# Patient Record
Sex: MALE | Race: WHITE | NOT HISPANIC OR LATINO
[De-identification: names, ages, dates, MRNs, and addresses within clinical notes are randomized per-mention and may not be internally consistent; named-entity substitution may affect disease eponyms.]

---

## 2018-01-26 ENCOUNTER — APPOINTMENT (OUTPATIENT)
Dept: NEUROLOGY | Facility: CLINIC | Age: 68
End: 2018-01-26
Payer: COMMERCIAL

## 2018-01-26 VITALS — HEART RATE: 92 BPM | DIASTOLIC BLOOD PRESSURE: 70 MMHG | SYSTOLIC BLOOD PRESSURE: 131 MMHG

## 2018-01-26 PROCEDURE — 99215 OFFICE O/P EST HI 40 MIN: CPT

## 2018-01-26 RX ORDER — ISRADIPINE 5 MG/1
5 CAPSULE ORAL
Qty: 720 | Refills: 0 | Status: ACTIVE | COMMUNITY
Start: 2017-06-20

## 2018-01-26 RX ORDER — RASAGILINE 1 MG/1
1 TABLET ORAL DAILY
Qty: 30 | Refills: 0 | Status: ACTIVE | COMMUNITY
Start: 2018-01-26 | End: 1900-01-01

## 2018-01-26 RX ORDER — PRAVASTATIN SODIUM 40 MG/1
40 TABLET ORAL
Qty: 90 | Refills: 0 | Status: ACTIVE | COMMUNITY
Start: 2017-07-25

## 2018-07-10 ENCOUNTER — APPOINTMENT (OUTPATIENT)
Dept: NEUROLOGY | Facility: CLINIC | Age: 68
End: 2018-07-10
Payer: COMMERCIAL

## 2018-07-10 VITALS
DIASTOLIC BLOOD PRESSURE: 58 MMHG | HEART RATE: 99 BPM | SYSTOLIC BLOOD PRESSURE: 122 MMHG | BODY MASS INDEX: 28.41 KG/M2 | HEIGHT: 67 IN | WEIGHT: 181 LBS

## 2018-07-10 PROCEDURE — 99214 OFFICE O/P EST MOD 30 MIN: CPT

## 2018-07-10 RX ORDER — DULOXETINE HYDROCHLORIDE 30 MG/1
30 CAPSULE, DELAYED RELEASE PELLETS ORAL
Qty: 270 | Refills: 0 | Status: ACTIVE | COMMUNITY
Start: 2018-06-14

## 2018-07-11 LAB
FERRITIN SERPL-MCNC: 109 NG/ML
FOLATE SERPL-MCNC: 10.4 NG/ML
IRON SERPL-MCNC: 87 UG/DL
TSH SERPL-ACNC: 1.83 UIU/ML
VIT B12 SERPL-MCNC: 422 PG/ML

## 2018-10-16 ENCOUNTER — APPOINTMENT (OUTPATIENT)
Dept: ORTHOPEDIC SURGERY | Facility: CLINIC | Age: 68
End: 2018-10-16
Payer: COMMERCIAL

## 2018-10-16 VITALS
SYSTOLIC BLOOD PRESSURE: 131 MMHG | DIASTOLIC BLOOD PRESSURE: 61 MMHG | HEIGHT: 67 IN | BODY MASS INDEX: 26.68 KG/M2 | HEART RATE: 85 BPM | WEIGHT: 170 LBS

## 2018-10-16 DIAGNOSIS — M23.91 UNSPECIFIED INTERNAL DERANGEMENT OF RIGHT KNEE: ICD-10-CM

## 2018-10-16 PROCEDURE — 73564 X-RAY EXAM KNEE 4 OR MORE: CPT | Mod: RT

## 2018-10-16 PROCEDURE — 20610 DRAIN/INJ JOINT/BURSA W/O US: CPT | Mod: RT

## 2018-10-16 PROCEDURE — 99204 OFFICE O/P NEW MOD 45 MIN: CPT | Mod: 25

## 2018-10-23 ENCOUNTER — FORM ENCOUNTER (OUTPATIENT)
Age: 68
End: 2018-10-23

## 2018-10-24 ENCOUNTER — APPOINTMENT (OUTPATIENT)
Dept: MRI IMAGING | Facility: CLINIC | Age: 68
End: 2018-10-24

## 2018-10-24 ENCOUNTER — OUTPATIENT (OUTPATIENT)
Dept: OUTPATIENT SERVICES | Facility: HOSPITAL | Age: 68
LOS: 1 days | End: 2018-10-24
Payer: COMMERCIAL

## 2018-10-24 DIAGNOSIS — Z00.8 ENCOUNTER FOR OTHER GENERAL EXAMINATION: ICD-10-CM

## 2018-10-24 PROCEDURE — 73721 MRI JNT OF LWR EXTRE W/O DYE: CPT | Mod: 26,RT

## 2018-10-24 PROCEDURE — 73721 MRI JNT OF LWR EXTRE W/O DYE: CPT

## 2019-01-15 ENCOUNTER — APPOINTMENT (OUTPATIENT)
Dept: NEUROLOGY | Facility: CLINIC | Age: 69
End: 2019-01-15
Payer: COMMERCIAL

## 2019-01-15 VITALS
WEIGHT: 181 LBS | DIASTOLIC BLOOD PRESSURE: 75 MMHG | HEIGHT: 67 IN | BODY MASS INDEX: 28.41 KG/M2 | HEART RATE: 93 BPM | SYSTOLIC BLOOD PRESSURE: 126 MMHG

## 2019-01-15 PROCEDURE — 99214 OFFICE O/P EST MOD 30 MIN: CPT

## 2019-01-15 RX ORDER — PRAVASTATIN SODIUM 40 MG/1
TABLET ORAL
Refills: 0 | Status: ACTIVE | COMMUNITY

## 2019-01-15 RX ORDER — RASAGILINE 1 MG/1
TABLET ORAL
Refills: 0 | Status: ACTIVE | COMMUNITY

## 2019-01-15 NOTE — DISCUSSION/SUMMARY
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 He also notice more changes in his handwriting, smaller and his voice was softer and lower as well. Some constipation, changes in his doreen, depressed stable now. Some changes in his memory more short term. No family history. The examination is remarkable for hypomimia, hypophonia, resting tremor on right side, rigidity and bradykinesia in right side. Gait with stooped posture, slow stride, short steps, decrease arm swing in right side. \par \par Overall, the clinical findings and history are indeed most consistent with idiopathic Parkinson's disease. Current symptomatology, asymmetric resting tremor, rigidity and bradykinesia, less consistent with atypical parkinsonism. He is having more difficulty with his daily activities for which treatment options were discussed, such as Azilect, dopamine agonists, or Sinemet. \par \par 1. Yoga, exercise, consider boxing and LSVT\par 2. He wants to hold off on DA, which is reasonable. \par 3. Continue isradipine (despite peripheral edema) and rasagiline.\par

## 2019-01-15 NOTE — PHYSICAL EXAM
[Person] : oriented to person [Place] : oriented to place [Time] : oriented to time [Short Term Intact] : short term memory intact [Registration Intact] : recent registration memory intact [Cranial Nerves Optic (II)] : visual acuity intact bilaterally,  visual fields full to confrontation, pupils equal round and reactive to light [Cranial Nerves Oculomotor (III)] : extraocular motion intact [Cranial Nerves Trigeminal (V)] : facial sensation intact symmetrically [Cranial Nerves Facial (VII)] : face symmetrical [Cranial Nerves Vestibulocochlear (VIII)] : hearing was intact bilaterally [Cranial Nerves Glossopharyngeal (IX)] : tongue and palate midline [Cranial Nerves Accessory (XI - Cranial And Spinal)] : head turning and shoulder shrug symmetric [Cranial Nerves Hypoglossal (XII)] : there was no tongue deviation with protrusion [Motor Strength] : muscle strength was normal in all four extremities [Motor Handedness Right-Handed] : the patient is right hand dominant [Paresis Pronator Drift Right-Sided] : no pronator drift on the right [Paresis Pronator Drift Left-Sided] : no pronator drift on the left [Sensation Tactile Decrease] : light touch was intact [Sensation Pain / Temperature Decrease] : pain and temperature was intact [Sensation Vibration Decrease] : vibration was intact [Proprioception] : proprioception was intact [Tremor] : a tremor present [Dysdiadochokinesia Bilaterally] : not present [Coordination - Dysmetria Impaired Finger-to-Nose Bilateral] : not present [FreeTextEntry1] : Facial expression and blinking rate are decreased. Tone in voice is decreased. Extraocular movements were intact with no square waves jerk seen. resting tremor in right upper extremity. Postural tremor noted bilaterally, worse in right. Increase tone in neck. Rigidity in grade 2 in right upper and lower extremity, grade 1 in left upper extremity. Bradykinesia in RAHM (right> left) toe taps on left side. Able to stand with arms crossed. Gait with stooped posture, short steps, slow stride, decrease arm swing in right side. Turns with multiple steps. Pull test was negative. [FreeTextEntry9] : 2 beat clonus in right lower extremity

## 2019-01-15 NOTE — HISTORY OF PRESENT ILLNESS
[FreeTextEntry1] : Patient is a 68 year old right handed man with past medical history with depression, psychiatrist, hypercholesterolemia, Parkinson's disease since about 2 years ago diagnosed by Dr. Goodson.\par \par Started with right sided tremor upon rest, when he is nervous it gets worse since about several years ago. Handwriting is smaller. No changes in his facial expression. Changes in his voice, after walking for several blocks or doing exercises he feels he has difficulty speaking, lower voice. No swallowing difficulties. No coughing after eating. Drooling a little at night while sleeping. Sleeps ok, occasionally wakes up during the night but is able to fall to sleep. Wakes up talking after taking naps. No trouble turning in bed. Has problems going from a sitting to a standing position. Some shuffling in gait, is worse in the afternoon. Drags his foot when walking. No recent falls. Does yoga 2x/week. Changes in his memory, he has trouble with short term, has trouble with words but is able to remember them later on. Stable from his mood. Dizziness sometimes when he gets up from doing yoga. No hallucinations. No constipation, with some improvement after starting Isradipine. More difficulty with daily tasks like buttoning his shirts. No family history of tremors and PD. \par \par Since last visit\par \par 1. Continues isradipine. Last visit we added azilect, no clear difference. Walking is a little more difficult. \par 2. In general, doing well, no limited, voice sometimes low. Able to button. Some memory lapses. \par 3. Mood is good on duloxetine/lamotrigine/buproprion.\par 4. Does yoga classes, and PT for right knee\par 5. Has restless feeling in legs at night. Also with urinary frequency and minor incontinence.  No clear RBD, but dreams "intensely", sleeps well\par .

## 2019-06-18 ENCOUNTER — APPOINTMENT (OUTPATIENT)
Dept: NEUROLOGY | Facility: CLINIC | Age: 69
End: 2019-06-18
Payer: COMMERCIAL

## 2019-06-18 PROCEDURE — 99214 OFFICE O/P EST MOD 30 MIN: CPT

## 2019-06-18 NOTE — PHYSICAL EXAM
[Person] : oriented to person [Time] : oriented to time [Place] : oriented to place [Short Term Intact] : short term memory intact [Registration Intact] : recent registration memory intact [Cranial Nerves Optic (II)] : visual acuity intact bilaterally,  visual fields full to confrontation, pupils equal round and reactive to light [Cranial Nerves Oculomotor (III)] : extraocular motion intact [Cranial Nerves Facial (VII)] : face symmetrical [Cranial Nerves Trigeminal (V)] : facial sensation intact symmetrically [Cranial Nerves Vestibulocochlear (VIII)] : hearing was intact bilaterally [Cranial Nerves Glossopharyngeal (IX)] : tongue and palate midline [Cranial Nerves Accessory (XI - Cranial And Spinal)] : head turning and shoulder shrug symmetric [Cranial Nerves Hypoglossal (XII)] : there was no tongue deviation with protrusion [Paresis Pronator Drift Right-Sided] : no pronator drift on the right [Motor Handedness Right-Handed] : the patient is right hand dominant [Motor Strength] : muscle strength was normal in all four extremities [Sensation Pain / Temperature Decrease] : pain and temperature was intact [Sensation Tactile Decrease] : light touch was intact [Paresis Pronator Drift Left-Sided] : no pronator drift on the left [Tremor] : a tremor present [Sensation Vibration Decrease] : vibration was intact [Proprioception] : proprioception was intact [FreeTextEntry1] : Facial expression and blinking rate are decreased. Tone in voice is decreased. Extraocular movements were intact with no square waves jerk seen. resting tremor in right upper extremity. Postural tremor noted bilaterally, worse in right. Increase tone in neck. Rigidity in grade 2 in right upper and lower extremity, grade 1 in left upper extremity. Bradykinesia in RAHM (right> left) and toe taps on left side. Able to stand with arms crossed. Gait with stooped posture, short steps, slow stride, decrease arm swing in right side. Turns with multiple steps. Pull test was negative. [Dysdiadochokinesia Bilaterally] : not present [Coordination - Dysmetria Impaired Finger-to-Nose Bilateral] : not present [FreeTextEntry9] : 2 beat clonus in right lower extremity

## 2019-06-18 NOTE — HISTORY OF PRESENT ILLNESS
[FreeTextEntry1] : Patient is a 68 year old right handed man with past medical history with depression, psychiatrist, hypercholesterolemia, Parkinson's disease since about 2 years ago diagnosed by Dr. Goodson.\par \par Started with right sided tremor upon rest, when he is nervous it gets worse since about several years ago. Handwriting is smaller. No changes in his facial expression. Changes in his voice, after walking for several blocks or doing exercises he feels he has difficulty speaking, lower voice. No swallowing difficulties. No coughing after eating. Drooling a little at night while sleeping. Sleeps ok, occasionally wakes up during the night but is able to fall to sleep. Wakes up talking after taking naps. No trouble turning in bed. Has problems going from a sitting to a standing position. Some shuffling in gait, is worse in the afternoon. Drags his foot when walking. No recent falls. Does yoga 2x/week. Changes in his memory, he has trouble with short term, has trouble with words but is able to remember them later on. Stable from his mood. Dizziness sometimes when he gets up from doing yoga. No hallucinations. No constipation, with some improvement after starting Isradipine. More difficulty with daily tasks like buttoning his shirts. No family history of tremors and PD. \par \par Since last visit\par \par 1. Was on isradipine. Last visit we added azilect, no clear difference. Lowered isradipine to 5 mg, no difference. Walking is a little more difficult, since last visit\par 2. In general, doing well, no limited, voice sometimes low. Able to button. Some memory lapses. \par 3. Mood is good on duloxetine/lamotrigine/buproprion.\par 4. Does yoga classes, and PT (from ) for right knee\par 5. Has restless feeling in legs at night. Also with urinary frequency and minor incontinence.  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

## 2019-06-18 NOTE — DISCUSSION/SUMMARY
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 He also notice more changes in his handwriting, smaller and his voice was softer and lower as well. Some constipation, changes in his doreen, depressed stable now. Some changes in his memory more short term. No family history. The examination is remarkable for hypomimia, hypophonia, resting tremor on right side, rigidity and bradykinesia in right side. Gait with stooped posture, slow stride, short steps, decrease arm swing in right side. \par \par Overall, the clinical findings and history are indeed most consistent with idiopathic Parkinson's disease. Current symptomatology, asymmetric resting tremor, rigidity and bradykinesia, less consistent with atypical parkinsonism. He is having more difficulty with his daily activities for which treatment options were discussed, such as Azilect, dopamine agonists, or Sinemet. \par \par 1. Yoga, exercise, consider boxing and LSVT\par 2. He wants to hold off on DA, which is reasonable. \par 3. Continue isradipine (despite peripheral edema) and rasagiline.\par

## 2019-12-18 ENCOUNTER — APPOINTMENT (OUTPATIENT)
Dept: NEUROLOGY | Facility: CLINIC | Age: 69
End: 2019-12-18
Payer: COMMERCIAL

## 2019-12-18 PROCEDURE — 99214 OFFICE O/P EST MOD 30 MIN: CPT

## 2019-12-18 NOTE — HISTORY OF PRESENT ILLNESS
[FreeTextEntry1] : Patient is a 69 year old right handed man with past medical history with depression, seeing psychiatrist, hypercholesterolemia, Parkinson's disease diagnosed by Dr. Goodson in 2015, but symptoms since about 2012 where people noticed his posture had changed.\par \par Started with right sided tremor upon rest, when he is nervous it gets worse since about several years ago. Handwriting is smaller. No changes in his facial expression. Changes in his voice, after walking for several blocks or doing exercises he feels he has difficulty speaking, lower voice. No swallowing difficulties. No coughing after eating. Drooling a little at night while sleeping. Sleeps ok, occasionally wakes up during the night but is able to fall to sleep. Wakes up talking after taking naps. No trouble turning in bed. Has problems going from a sitting to a standing position. Some shuffling in gait, is worse in the afternoon. Drags his foot when walking. No recent falls. Does yoga 2x/week. Changes in his memory, he has trouble with short term, has trouble with words but is able to remember them later on. Stable from his mood. Dizziness sometimes when he gets up from doing yoga. No hallucinations. No constipation, with some improvement after starting Isradipine. More difficulty with daily tasks like buttoning his shirts. No family history of tremors and PD. \par \par Since last visit,\par \par 1. Added Azilect, no clear difference. Lowered isradipine to 5 mg, no difference. Walking is stable.\par 2. In general, doing well, no limited, voice sometimes low. Able to button. Memory is stable. No longer constipation. Urinary frequency. No weight loss or loss of appetite. \par 3. Mood is good on duloxetine/lamotrigine/bupropion. No anxiety. \par 4. Does yoga classes, and PT (from ) \par 5. Has restless feeling in legs at night. Also with urinary frequency and minor incontinence.  Sleeps well. Rarely talks in sleep. No acting out of dreams. He does have vivid dreaming. 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\par \par He reports that certain things like putting on socks is still difficult. Driving with less depth perception, but feels comfortable. Memory is stable. Stiffness remains the same. Normally, does not get tremors, but when frustrated or excited it returns. Voice with exertion becomes strained, but returns to baseline quickly. Able to complete work full time without trouble. Walking is stable. No falls. Interested in PT.

## 2019-12-18 NOTE — PHYSICAL EXAM
[Person] : oriented to person [Place] : oriented to place [Time] : oriented to time [Short Term Intact] : short term memory intact [Registration Intact] : recent registration memory intact [Cranial Nerves Optic (II)] : visual acuity intact bilaterally,  visual fields full to confrontation, pupils equal round and reactive to light [Cranial Nerves Oculomotor (III)] : extraocular motion intact [Cranial Nerves Trigeminal (V)] : facial sensation intact symmetrically [Cranial Nerves Facial (VII)] : face symmetrical [Cranial Nerves Vestibulocochlear (VIII)] : hearing was intact bilaterally [Cranial Nerves Glossopharyngeal (IX)] : tongue and palate midline [Cranial Nerves Accessory (XI - Cranial And Spinal)] : head turning and shoulder shrug symmetric [Cranial Nerves Hypoglossal (XII)] : there was no tongue deviation with protrusion [Motor Strength] : muscle strength was normal in all four extremities [Motor Handedness Right-Handed] : the patient is right hand dominant [Sensation Tactile Decrease] : light touch was intact [Sensation Pain / Temperature Decrease] : pain and temperature was intact [Sensation Vibration Decrease] : vibration was intact [Proprioception] : proprioception was intact [Paresis Pronator Drift Right-Sided] : no pronator drift on the right [Paresis Pronator Drift Left-Sided] : no pronator drift on the left [Coordination - Dysmetria Impaired Finger-to-Nose Bilateral] : not present [Dysdiadochokinesia Bilaterally] : not present [FreeTextEntry9] : 2 beat clonus in right lower extremity [FreeTextEntry1] : Facial expression and blinking rate are decreased. Tone in voice is decreased. Extraocular movements were intact with no square waves jerk seen. mild resting tremor in right upper extremity. Mild Postural tremor noted bilaterally, worse in right. Increase tone in neck. \par Rigidity in grade 2 in right upper and lower extremity, grade 1 in left upper extremity. Bradykinesia in RAHM (right> left) and toe taps on left side. Able to stand with arms crossed. Gait with stooped posture, short steps, slow stride, decrease arm swing in right side. Turns with multiple steps. Pull test was negative.

## 2019-12-18 NOTE — DISCUSSION/SUMMARY
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 He also notice more changes in his handwriting, smaller and his voice was softer and lower as well. Some constipation, changes in his doreen, depressed stable now. Some changes in his memory more short term. No family history. The examination is remarkable for hypomimia, hypophonia, resting tremor on right side, rigidity and bradykinesia in right side. Gait with stooped posture, slow stride, short steps, decrease arm swing in right side. \par \par Overall, the clinical findings and history are indeed most consistent with idiopathic Parkinson's disease. Current symptomatology, asymmetric resting tremor, rigidity and bradykinesia, less consistent with atypical parkinsonism. He is having more difficulty with his daily activities for which treatment options were discussed, such as Azilect, dopamine agonists, or Sinemet. \par \par Discussed taking precautions for driving.\par \par 1. Yoga, exercise, consider boxing and LSVT\par 2. He wants to hold off on DA, or LD which is reasonable. \par 3. Continue isradipine (despite peripheral edema) and rasagiline.\par 4. PT referral\par

## 2020-06-10 ENCOUNTER — APPOINTMENT (OUTPATIENT)
Dept: NEUROLOGY | Facility: CLINIC | Age: 70
End: 2020-06-10
Payer: COMMERCIAL

## 2020-06-10 PROCEDURE — 99214 OFFICE O/P EST MOD 30 MIN: CPT | Mod: 95

## 2020-06-10 NOTE — HISTORY OF PRESENT ILLNESS
[Home] : at home, [unfilled] , at the time of the visit. [Medical Office: ([facility])___] : at the medical office located in  [Verbal consent obtained from patient] : the patient, [unfilled] [FreeTextEntry1] : Patient is a 69 year old right handed man with past medical history with depression, seeing psychiatrist, hypercholesterolemia, Parkinson's disease diagnosed by Dr. Goodson in 2015, but symptoms since about 2012 where people noticed his posture had changed.\par \par Started with right sided tremor upon rest, when he is nervous it gets worse since about several years ago. Handwriting is smaller. No changes in his facial expression. Changes in his voice, after walking for several blocks or doing exercises he feels he has difficulty speaking, lower voice. No swallowing difficulties. No coughing after eating. Drooling a little at night while sleeping. Sleeps ok, occasionally wakes up during the night but is able to fall to sleep. Wakes up talking after taking naps. No trouble turning in bed. Has problems going from a sitting to a standing position. Some shuffling in gait, is worse in the afternoon. Drags his foot when walking. No recent falls. Does yoga 2x/week. Changes in his memory, he has trouble with short term, has trouble with words but is able to remember them later on. Stable from his mood. Dizziness sometimes when he gets up from doing yoga. No hallucinations. No constipation, with some improvement after starting Isradipine. More difficulty with daily tasks like buttoning his shirts. No family history of tremors and PD. \par \par Since last visit, his writing is getting worse and his dexterity is becoming less. More tremor in both hands, more on the right. He has developed oily skin. Keyboard is harder to use, but still able to do it. Being at home has been easier, but getting dressed is a little more difficult. No freezing of gait or falls.\par \par 1. On Azilect, no clear difference. Isradipine 5 mg, no difference. Walking is stable.\par 2. In general, doing well, no limited, voice sometimes low in public. Memory is stable. No longer constipation. Urinary frequency, episodic. No weight loss or loss of appetite. \par 3. Mood is good on duloxetine/lamotrigine/bupropion. No anxiety. \par 4. Not in yoga, elliptical 20 minutes a day, limited by right leg fatigue\par 5. Has restless feeling in legs at night, once in a while. Sleeps well. Rarely talks in sleep. No acting out of dreams. He does have vivid dreaming. 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

## 2020-06-10 NOTE — PHYSICAL EXAM
[Person] : oriented to person [Place] : oriented to place [Time] : oriented to time [Short Term Intact] : short term memory intact [Cranial Nerves Oculomotor (III)] : extraocular motion intact [Cranial Nerves Facial (VII)] : face symmetrical [Cranial Nerves Vestibulocochlear (VIII)] : hearing was intact bilaterally [Cranial Nerves Accessory (XI - Cranial And Spinal)] : head turning and shoulder shrug symmetric [Cranial Nerves Hypoglossal (XII)] : there was no tongue deviation with protrusion [Motor Handedness Right-Handed] : the patient is right hand dominant [Paresis Pronator Drift Right-Sided] : no pronator drift on the right [Paresis Pronator Drift Left-Sided] : no pronator drift on the left [Dysdiadochokinesia Bilaterally] : not present [FreeTextEntry1] : Facial expression is reduced and blinking rate is mildly decreased. Tone in voice is decreased. Extraocular movements were intact with normal saccades. Mild resting tremor in right upper extremity, and to a lesser extent on the left. Mild Postural tremor noted bilaterally, worse in right.  \par Bradykinesia in RAHM (right > left). Able to stand without use of arms. Gait with stooped posture, short steps, slow stride, decrease arm swing bilaterally, right more than left. Turns with multiple steps. No freezing of gait or shuffling.\par \par Unable to adequately assess rigidity and postural reflexes via telehealth. [FreeTextEntry4] : 3/3 recall [Coordination - Dysmetria Impaired Finger-to-Nose Bilateral] : not present [FreeTextEntry9] : 2 beat clonus in right lower extremity

## 2020-06-10 NOTE — DISCUSSION/SUMMARY
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 He also noticed more changes in his handwriting, smaller and his voice was softer and lower as well. Some constipation, changes in his mood, depressed, stable now. Some changes in his memory, more short term. No family history. The examination is remarkable for hypomimia, hypophonia, resting and postural tremors bilaterally, right more than left, and bradykinesia on the right more than left. Gait with stooped posture, slow stride, short steps, decreased arm swing in right more than left, multistep turns. \par \par Overall, the clinical findings and history are indeed most consistent with idiopathic Parkinson's disease. Current symptomatology, asymmetric resting tremor, rigidity and bradykinesia, less consistent with atypical parkinsonism. He is having more difficulty with his daily activities for which treatment options were discussed, dopamine agonists, or levodopa. \par \par 1. Continue exercise, and resume yoga when able\par 2. 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 Continue isradipine 5mg (despite peripheral edema) and rasagiline.\par 4. Will refer to PT once able to go\par \par RTC 6 months

## 2020-09-24 RX ORDER — PRAMIPEXOLE DIHYDROCHLORIDE 0.38 MG/1
0.38 TABLET, EXTENDED RELEASE ORAL
Qty: 30 | Refills: 5 | Status: DISCONTINUED | COMMUNITY
Start: 2020-06-23 | End: 2020-09-24

## 2020-12-01 ENCOUNTER — APPOINTMENT (OUTPATIENT)
Dept: NEUROLOGY | Facility: CLINIC | Age: 70
End: 2020-12-01

## 2020-12-03 ENCOUNTER — APPOINTMENT (OUTPATIENT)
Dept: NEUROLOGY | Facility: CLINIC | Age: 70
End: 2020-12-03
Payer: COMMERCIAL

## 2020-12-03 VITALS
SYSTOLIC BLOOD PRESSURE: 184 MMHG | WEIGHT: 180 LBS | HEART RATE: 101 BPM | BODY MASS INDEX: 28.25 KG/M2 | HEIGHT: 67 IN | DIASTOLIC BLOOD PRESSURE: 107 MMHG

## 2020-12-03 VITALS — TEMPERATURE: 96.7 F

## 2020-12-03 PROCEDURE — 99072 ADDL SUPL MATRL&STAF TM PHE: CPT

## 2020-12-03 PROCEDURE — 99214 OFFICE O/P EST MOD 30 MIN: CPT

## 2020-12-03 RX ORDER — CARBIDOPA AND LEVODOPA 25; 100 MG/1; MG/1
25-100 TABLET ORAL
Qty: 90 | Refills: 5 | Status: ACTIVE | COMMUNITY
Start: 2020-12-03 | End: 1900-01-01

## 2020-12-03 NOTE — HISTORY OF PRESENT ILLNESS
[FreeTextEntry1] : Patient is a 70 year old right handed man with past medical history with depression, seeing psychiatrist, hypercholesterolemia, Parkinson's disease diagnosed by Dr. Goodson in 2015, but symptoms since about 2012 where people noticed his posture had changed.\par \par Started with right sided tremor upon rest, when he is nervous it gets worse since about several years ago. Handwriting is smaller. No changes in his facial expression. Changes in his voice, after walking for several blocks or doing exercises he feels he has difficulty speaking, lower voice. No swallowing difficulties. No coughing after eating. Drooling a little at night while sleeping. Sleeps ok, occasionally wakes up during the night but is able to fall to sleep. Wakes up talking after taking naps. No trouble turning in bed. Has problems going from a sitting to a standing position. Some shuffling in gait, is worse in the afternoon. Drags his foot when walking. No recent falls. Does yoga 2x/week. Changes in his memory, he has trouble with short term, has trouble with words but is able to remember them later on. Stable from his mood. Dizziness sometimes when he gets up from doing yoga. No hallucinations. No constipation, with some improvement after starting Isradipine. More difficulty with daily tasks like buttoning his shirts. No family history of tremors and PD. \par \par 1. Since last visit, more trouble with walking and getting up from chair\par 2. ON azilect, no clear difference. stopped Isradipine 5 mg, no difference. Started ropinirole ER 6 mg, uss of hands is a little better. No ICD\par 3. Gained weight during covid\par 4. Mood is good on duloxetine/lamotrigine/bupropion. No anxiety. \par 5. Has restless feeling in legs at night, once in a while. Sleeps well. Rarely talks in sleep. No acting out of dreams. He does have vivid dreaming. 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

## 2020-12-03 NOTE — DISCUSSION/SUMMARY
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 He also noticed more changes in his handwriting, smaller and his voice was softer and lower as well. Some constipation, changes in his mood, depressed, stable now. Some changes in his memory, more short term. No family history. The examination is remarkable for hypomimia, hypophonia, resting and postural tremors bilaterally, right more than left, and bradykinesia on the right more than left. Gait with stooped posture, slow stride, short steps, decreased arm swing in right more than left, multistep turns. \par \par Overall, the clinical findings and history are indeed most consistent with idiopathic Parkinson's disease. Current symptomatology, asymmetric resting tremor, rigidity and bradykinesia, less consistent with atypical parkinsonism. He is having more difficulty with his daily activities for which treatment options were discussed, dopamine agonists, or levodopa. \par \par 1. Continue exercise, and resume yoga when able\par 2. He does have more trouble with walking and sitting, will start LD now, starting with 1/2 pill tid. \par 3. Continue rasagiline. and ropinirole\par 4. Rechecked BP manually, /92, advised to call PMD\par \par We discussed the above impression, plan and recommendations during the visit. Counseling represented more then 50% of the 30 minute visit time

## 2021-06-09 ENCOUNTER — APPOINTMENT (OUTPATIENT)
Dept: NEUROLOGY | Facility: CLINIC | Age: 71
End: 2021-06-09
Payer: COMMERCIAL

## 2021-06-09 VITALS
WEIGHT: 193 LBS | DIASTOLIC BLOOD PRESSURE: 97 MMHG | BODY MASS INDEX: 30.29 KG/M2 | SYSTOLIC BLOOD PRESSURE: 170 MMHG | HEIGHT: 67 IN | HEART RATE: 95 BPM

## 2021-06-09 PROCEDURE — 99214 OFFICE O/P EST MOD 30 MIN: CPT

## 2021-06-09 PROCEDURE — 99072 ADDL SUPL MATRL&STAF TM PHE: CPT

## 2021-06-09 NOTE — HISTORY OF PRESENT ILLNESS
[FreeTextEntry1] : Patient is a 70 year old right handed man with past medical history with depression, seeing psychiatrist, hypercholesterolemia, Parkinson's disease diagnosed by Dr. Goodson in 2015, but symptoms since about 2012 where people noticed his posture had changed.\par \par Started with right sided tremor upon rest, when he is nervous it gets worse since about several years ago. Handwriting is smaller. No changes in his facial expression. Changes in his voice, after walking for several blocks or doing exercises he feels he has difficulty speaking, lower voice. No swallowing difficulties. No coughing after eating. Drooling a little at night while sleeping. Sleeps ok, occasionally wakes up during the night but is able to fall to sleep. Wakes up talking after taking naps. No trouble turning in bed. Has problems going from a sitting to a standing position. Some shuffling in gait, is worse in the afternoon. Drags his foot when walking. No recent falls. Does yoga 2x/week. Changes in his memory, he has trouble with short term, has trouble with words but is able to remember them later on. Stable from his mood. Dizziness sometimes when he gets up from doing yoga. No hallucinations. No constipation, with some improvement after starting Isradipine. More difficulty with daily tasks like buttoning his shirts. No family history of tremors and PD. \par \par 1. Since last visit, more trouble with walking and getting up from chair\par 2. On azilect, no clear difference. stopped Isradipine 5 mg, no difference. Started ropinirole ER 6 mg, use of hands is a little better. No ICD\par 3. Gained more weight during covid\par 4. Mood is good on duloxetine/lamotrigine/bupropion. No anxiety. \par 5. Has restless feeling in legs at night, once in a while. Sleeps well. Rarely talks in sleep. No acting out of dreams. He does have vivid dreaming. 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

## 2021-06-09 NOTE — DISCUSSION/SUMMARY
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 He also noticed more changes in his handwriting, smaller and his voice was softer and lower as well. Some constipation, changes in his mood, depressed, stable now. Some changes in his memory, more short term. No family history. \par \par Overall, the clinical findings and history are indeed most consistent with idiopathic Parkinson's disease. Current symptomatology, asymmetric resting tremor, rigidity and bradykinesia, less consistent with atypical parkinsonism. He is having more difficulty with his daily activities for which treatment options were discussed, dopamine agonists, or levodopa. \par \par 1. Continue exercise, and resume PT when able\par 2. He does have more trouble with walking and sitting\par 3. Continue rasagiline. and ropinirole, can increase to 8 mg as he is not ready for LD\par \par We discussed the above impression, plan and recommendations during the visit. Counseling represented more then 50% of the 30 minute visit time

## 2021-06-09 NOTE — PHYSICAL EXAM
[Person] : oriented to person [Place] : oriented to place [Time] : oriented to time [Cranial Nerves Oculomotor (III)] : extraocular motion intact [Cranial Nerves Facial (VII)] : face symmetrical [Cranial Nerves Vestibulocochlear (VIII)] : hearing was intact bilaterally [Cranial Nerves Accessory (XI - Cranial And Spinal)] : head turning and shoulder shrug symmetric [Cranial Nerves Hypoglossal (XII)] : there was no tongue deviation with protrusion [Motor Handedness Right-Handed] : the patient is right hand dominant [Paresis Pronator Drift Right-Sided] : no pronator drift on the right [Paresis Pronator Drift Left-Sided] : no pronator drift on the left [Dysdiadochokinesia Bilaterally] : not present [Coordination - Dysmetria Impaired Finger-to-Nose Bilateral] : not present [FreeTextEntry1] : Facial expression is reduced and blinking rate is mildly decreased. Tone in voice is decreased. Extraocular movements were intact with normal saccades. Mild resting tremor in right upper extremity, and to a lesser extent on the left. Mild Postural tremor noted bilaterally, worse in right. Mild R>L rigidity.\par \par Bradykinesia in RAHM (right > left). Able to stand without use of arms. Gait with stooped posture, short steps, slow stride, decrease arm swing bilaterally, right more than left. Turns with multiple steps. No freezing of gait or shuffling. Pull test negative. \par \par Intermittent right hand tremor [FreeTextEntry9] : 2 beat clonus in right lower extremity

## 2021-12-06 ENCOUNTER — NON-APPOINTMENT (OUTPATIENT)
Age: 71
End: 2021-12-06

## 2021-12-10 ENCOUNTER — APPOINTMENT (OUTPATIENT)
Dept: NEUROLOGY | Facility: CLINIC | Age: 71
End: 2021-12-10
Payer: COMMERCIAL

## 2021-12-10 VITALS
WEIGHT: 188 LBS | BODY MASS INDEX: 29.51 KG/M2 | DIASTOLIC BLOOD PRESSURE: 96 MMHG | HEIGHT: 67 IN | SYSTOLIC BLOOD PRESSURE: 170 MMHG | HEART RATE: 95 BPM

## 2021-12-10 PROCEDURE — 99214 OFFICE O/P EST MOD 30 MIN: CPT

## 2021-12-10 NOTE — DISCUSSION/SUMMARY
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 He also noticed more changes in his handwriting, smaller and his voice was softer and lower as well. Some constipation, changes in his mood, depressed, stable now. Some changes in his memory, more short term. No family history. \par \par Overall, the clinical findings and history are indeed most consistent with idiopathic Parkinson's disease. Current symptomatology, asymmetric resting tremor, rigidity and bradykinesia, less consistent with atypical parkinsonism. He is having more difficulty with his daily activities for which treatment options were discussed, dopamine agonists, or levodopa. \par \par 1. Continue exercise, and resume PT when able\par 2. He does have more trouble with walking and sitting\par 3. Continue rasagiline. and ropinirole, can increase to 12 mg as he is not ready for LD. Counselled on side effects. \par \par We discussed the above impression, plan and recommendations during the visit. Counseling represented more then 50% of the 30 minute visit time.

## 2021-12-10 NOTE — PHYSICAL EXAM
[Person] : oriented to person [Place] : oriented to place [Time] : oriented to time [Naming Objects] : no difficulty naming common objects [Repeating Phrases] : no difficulty repeating a phrase [Fluency] : fluency intact [Comprehension] : comprehension intact [Cranial Nerves Oculomotor (III)] : extraocular motion intact [Cranial Nerves Facial (VII)] : face symmetrical [Cranial Nerves Vestibulocochlear (VIII)] : hearing was intact bilaterally [Cranial Nerves Accessory (XI - Cranial And Spinal)] : head turning and shoulder shrug symmetric [Cranial Nerves Hypoglossal (XII)] : there was no tongue deviation with protrusion [Motor Handedness Right-Handed] : the patient is right hand dominant [Paresis Pronator Drift Right-Sided] : no pronator drift on the right [Paresis Pronator Drift Left-Sided] : no pronator drift on the left [Dysdiadochokinesia Bilaterally] : not present [Coordination - Dysmetria Impaired Finger-to-Nose Bilateral] : not present [FreeTextEntry1] : Facial expression is reduced and blinking rate is mildly decreased. Tone in voice is decreased. Extraocular movements were intact with normal saccades. Mild resting tremor in right upper extremity, and to a lesser extent on the left. Mild Postural tremor noted bilaterally, worse in right. Mild Right>left rigidity.\par \par Bradykinesia in RAHM (right > left). Able to stand without use of arms. Gait with stooped posture, short steps, slow stride, decrease arm swing bilaterally, right more than left. Turns with multiple steps. No freezing of gait or shuffling. Pull test negative. \par \par No right arm tremor today.  [FreeTextEntry9] : 2 beat clonus in right lower extremity

## 2021-12-10 NOTE — HISTORY OF PRESENT ILLNESS
[FreeTextEntry1] : Patient is a 71 year old right handed man with past medical history with depression, seeing psychiatrist, hypercholesterolemia, Parkinson's disease diagnosed by Dr. Goodson in 2015, but symptoms since about 2012 where people noticed his posture had changed.\par \par Started with right sided tremor upon rest, when he is nervous it gets worse since about several years ago. Handwriting is smaller. No changes in his facial expression. Changes in his voice, after walking for several blocks or doing exercises he feels he has difficulty speaking, lower voice. No swallowing difficulties. No coughing after eating. Drooling a little at night while sleeping. Sleeps ok, occasionally wakes up during the night but is able to fall to sleep. Wakes up talking after taking naps. No trouble turning in bed. Has problems going from a sitting to a standing position. Some shuffling in gait, is worse in the afternoon. Drags his foot when walking. No recent falls. Does yoga 2x/week. Changes in his memory, he has trouble with short term, has trouble with words but is able to remember them later on. Stable from his mood. Dizziness sometimes when he gets up from doing yoga. No hallucinations. No constipation, with some improvement after starting Isradipine. More difficulty with daily tasks like buttoning his shirts. No family history of tremors and PD. \par \par 1. Since last visit, more trouble with walking and getting up from chair. Mobility "varies by the day". No falls. No drooling or dysphagia. ALDs ok, a little slow. Writing better since ropinirole. Tremor still only in right hand. \par 2. On rasagiline, no clear difference. stopped Isradipine 5 mg, no difference. Increased ropinirole ER 8 mg, use of hands is a little better. No ICDs. \par 3. Gained more weight during covid\par 4. Mood is good on duloxetine/lamotrigine/bupropion. No anxiety. Has restless feeling in legs at night, once in a while. Sleeps well. Rarely talks in sleep. No acting out of dreams, but does have vivid dreaming.. 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

## 2022-01-12 NOTE — PHYSICAL EXAM
[Person] : oriented to person [Place] : oriented to place [Time] : oriented to time [Short Term Intact] : short term memory intact [Cranial Nerves Oculomotor (III)] : extraocular motion intact [Cranial Nerves Facial (VII)] : face symmetrical [Cranial Nerves Vestibulocochlear (VIII)] : hearing was intact bilaterally [Cranial Nerves Accessory (XI - Cranial And Spinal)] : head turning and shoulder shrug symmetric [Cranial Nerves Hypoglossal (XII)] : there was no tongue deviation with protrusion [Motor Handedness Right-Handed] : the patient is right hand dominant [Paresis Pronator Drift Right-Sided] : no pronator drift on the right [Paresis Pronator Drift Left-Sided] : no pronator drift on the left [Dysdiadochokinesia Bilaterally] : not present [Coordination - Dysmetria Impaired Finger-to-Nose Bilateral] : not present [FreeTextEntry4] : 3/3 recall [FreeTextEntry1] : Facial expression is reduced and blinking rate is mildly decreased. Tone in voice is decreased. Extraocular movements were intact with normal saccades. Mild resting tremor in right upper extremity, and to a lesser extent on the left. Mild Postural tremor noted bilaterally, worse in right. Mild R>L rigidity.\par Bradykinesia in RAHM (right > left). Able to stand without use of arms. Gait with stooped posture, short steps, slow stride, decrease arm swing bilaterally, right more than left. Turns with multiple steps. No freezing of gait or shuffling. Pull test negative. \par \par Intermittent right hand tremor [FreeTextEntry9] : 2 beat clonus in right lower extremity [FreeTextEntry1] : Anoscopy and rubber band ligation\par \par I discussed the reason for the procedure, and the risks and benefits with the patient. Risks including bleeding and discomfort were discussed. The patient understood or discussion and would like to proceed with the procedure.\par \par After completing a digital rectal exam a well lubricated anoscope was gently inserted into the anus. Complete inspection was performed. No tenderness on insertion of the anoscope, and the procedure was tolerated well. No fissures, fistula openings, or masses were identified.\par \par Findings: Slightly enlarged left lateral and right posterior hemorrhoids. Rubber band ligation x 2 performed (Left lateral x 1, and right posterior x 1)

## 2022-06-10 ENCOUNTER — APPOINTMENT (OUTPATIENT)
Dept: NEUROLOGY | Facility: CLINIC | Age: 72
End: 2022-06-10
Payer: COMMERCIAL

## 2022-06-10 VITALS — DIASTOLIC BLOOD PRESSURE: 82 MMHG | HEART RATE: 102 BPM | SYSTOLIC BLOOD PRESSURE: 131 MMHG

## 2022-06-10 PROCEDURE — 99214 OFFICE O/P EST MOD 30 MIN: CPT

## 2022-06-10 NOTE — DISCUSSION/SUMMARY
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 He also noticed more changes in his handwriting, smaller and his voice was softer and lower as well. Some constipation, changes in his mood, depressed, stable now. Some changes in his memory, more short term. No family history. \par \par Overall, the clinical findings and history are indeed most consistent with idiopathic Parkinson's disease. Current symptomatology, asymmetric resting tremor, rigidity and bradykinesia, less consistent with atypical parkinsonism. He is having more difficulty with his daily activities for which treatment options were discussed, dopamine agonists, or levodopa. \par \par 1. Continue exercise, and resume PT/Exercise when able. Rock steady boxing.\par 2. He does have more trouble with walking and sitting\par 3. Continue rasagiline. and ropinirole. Counselled on side effects. \par \par We discussed the above impression, plan and recommendations during the visit. Counseling represented more then 50% of the 30 minute visit time.

## 2022-06-10 NOTE — PHYSICAL EXAM
[Person] : oriented to person [Place] : oriented to place [Time] : oriented to time [Naming Objects] : no difficulty naming common objects [Repeating Phrases] : no difficulty repeating a phrase [Fluency] : fluency intact [Comprehension] : comprehension intact [Cranial Nerves Oculomotor (III)] : extraocular motion intact [Cranial Nerves Facial (VII)] : face symmetrical [Cranial Nerves Vestibulocochlear (VIII)] : hearing was intact bilaterally [Cranial Nerves Accessory (XI - Cranial And Spinal)] : head turning and shoulder shrug symmetric [Motor Handedness Right-Handed] : the patient is right hand dominant [Paresis Pronator Drift Right-Sided] : no pronator drift on the right [Paresis Pronator Drift Left-Sided] : no pronator drift on the left [Dysdiadochokinesia Bilaterally] : not present [Coordination - Dysmetria Impaired Finger-to-Nose Bilateral] : not present [FreeTextEntry1] : Facial expression is reduced and blinking rate is mildly decreased. Tone in voice is decreased. Extraocular movements were intact with normal saccades. Mild resting tremor in right upper extremity, and to a lesser extent on the left. Mild Postural tremor noted bilaterally, worse in right. Mild Right>left rigidity.\par \par Bradykinesia in RAHM (right > left). Able to stand without use of arms. Gait with stooped posture, short steps, slow stride, decrease arm swing bilaterally, right more than left. Turns with multiple steps. No freezing of gait or shuffling. Pull test negative. \par \par Mild right arm tremor today.  [FreeTextEntry9] : 2 beat clonus in right lower extremity

## 2022-06-10 NOTE — HISTORY OF PRESENT ILLNESS
[FreeTextEntry1] : Patient is a 71 year old right handed man with past medical history with depression, seeing psychiatrist, hypercholesterolemia, Parkinson's disease diagnosed by Dr. Goodson in 2015, but symptoms since about 2012 where people noticed his posture had changed.\par \par Started with right sided tremor upon rest, when he is nervous it gets worse since about several years ago. Handwriting is smaller. No changes in his facial expression. Changes in his voice, after walking for several blocks or doing exercises he feels he has difficulty speaking, lower voice. No swallowing difficulties. No coughing after eating. Drooling a little at night while sleeping. Sleeps ok, occasionally wakes up during the night but is able to fall to sleep. Wakes up talking after taking naps. No trouble turning in bed. Has problems going from a sitting to a standing position. Some shuffling in gait, is worse in the afternoon. Drags his foot when walking. No recent falls. Does yoga 2x/week. Changes in his memory, he has trouble with short term, has trouble with words but is able to remember them later on. Stable from his mood. Dizziness sometimes when he gets up from doing yoga. No hallucinations. No constipation, with some improvement after starting Isradipine. More difficulty with daily tasks like buttoning his shirts. No family history of tremors and PD. \par \par 1. Since last visit, more trouble with walking and getting up from chair. Mobility "varies by the day". No falls. No drooling or dysphagia. ALDs ok, a little slow.  Tremor still only in right hand. \par 2. On rasagiline, no clear difference. Stopped Isradipine 5 mg, no difference for PD, but went back on for blood pressure. Increased ropinirole ER to 12mg, printing is better. No ICDs. Writing better since ropinirole.\par 3. Gained more weight during covid, but no compulsive eating. \par 4. Mood is good on duloxetine/lamotrigine/bupropion. No anxiety. Has restless feeling in legs at night, once in a while. Sleeps well. Rarely talks in sleep. No acting out of dreams, but does have vivid dreaming.. 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 \par

## 2022-11-18 ENCOUNTER — APPOINTMENT (OUTPATIENT)
Dept: NEUROLOGY | Facility: CLINIC | Age: 72
End: 2022-11-18

## 2022-12-13 ENCOUNTER — APPOINTMENT (OUTPATIENT)
Dept: NEUROLOGY | Facility: CLINIC | Age: 72
End: 2022-12-13

## 2022-12-13 VITALS
DIASTOLIC BLOOD PRESSURE: 82 MMHG | WEIGHT: 188 LBS | HEIGHT: 67 IN | SYSTOLIC BLOOD PRESSURE: 131 MMHG | BODY MASS INDEX: 29.51 KG/M2 | HEART RATE: 85 BPM

## 2022-12-13 PROCEDURE — 99214 OFFICE O/P EST MOD 30 MIN: CPT

## 2022-12-13 NOTE — DISCUSSION/SUMMARY
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 He also noticed more changes in his handwriting, smaller and his voice was softer and lower as well. Some constipation, changes in his mood, depressed, stable now. Some changes in his memory, more short term. No family history. \par \par Overall, the clinical findings and history are indeed most consistent with idiopathic Parkinson's disease. Current symptomatology, asymmetric resting tremor, rigidity and bradykinesia, less consistent with atypical parkinsonism. He is having more difficulty with his daily activities for which treatment options were discussed, dopamine agonists, or levodopa. \par \par 1. 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 He does have more trouble with walking and sitting\par 3. Continue rasagiline and ropinirole. Counselled on side effects including ICDs. May need LD, but PT first\par \par We discussed the above impression, plan and recommendations during the visit. Counseling represented more then 50% of the 30 minute visit time.

## 2022-12-13 NOTE — HISTORY OF PRESENT ILLNESS
[FreeTextEntry1] : Patient is a 72 year old right handed man with past medical history with depression, seeing psychiatrist, hypercholesterolemia, Parkinson's disease diagnosed by Dr. Goodson in 2015, but symptoms since about 2012 where people noticed his posture had changed. Started with right sided tremor upon rest, when he is nervous it gets worse since about several years ago. \par \par 1. Since last visit, more trouble with standing for long periods. Walking and getting up from chair. Mobility "varies by the day". No falls, but feels balance is worse. No drooling or dysphagia. ALDs ok, a little slow. Tremor still only in right hand. Some back pain in left, takes motrin and tylenol. \par 2. On rasagiline, no clear difference. Stopped Isradipine 5 mg, no difference for PD, but went back on for blood pressure. Increased ropinirole ER to 12mg, printing is better. No ICDs\par 3. Gained more weight during covid, but not compulsive eating. \par 4. Short term memory a little worse. Mood is good on duloxetine/lamotrigine/bupropion. No anxiety. Has restless feeling in legs at night, once in a while. Sleeps well. Rarely talks in sleep. No acting out of dreams, but does have vivid dreaming.. 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 \par 6. BP: taking isradipine and also HCTZ\par

## 2022-12-13 NOTE — PHYSICAL EXAM
[Person] : oriented to person [Place] : oriented to place [Time] : oriented to time [Naming Objects] : no difficulty naming common objects [Repeating Phrases] : no difficulty repeating a phrase [Fluency] : fluency intact [Comprehension] : comprehension intact [Cranial Nerves Oculomotor (III)] : extraocular motion intact [Cranial Nerves Facial (VII)] : face symmetrical [Cranial Nerves Vestibulocochlear (VIII)] : hearing was intact bilaterally [Cranial Nerves Accessory (XI - Cranial And Spinal)] : head turning and shoulder shrug symmetric [Motor Handedness Right-Handed] : the patient is right hand dominant [Paresis Pronator Drift Right-Sided] : no pronator drift on the right [Paresis Pronator Drift Left-Sided] : no pronator drift on the left [Dysdiadochokinesia Bilaterally] : not present [Coordination - Dysmetria Impaired Finger-to-Nose Bilateral] : not present [1+] : Brachioradialis left 1+ [0] : Ankle jerk left 0 [FreeTextEntry1] : Facial expression is reduced and blinking rate is mildly decreased. Tone in voice is decreased. Extraocular movements were intact with normal saccades. Mild resting tremor in right upper extremity, and to a lesser extent on the left. Mild Postural tremor noted bilaterally, worse in right. \par \par Mild Right>left rigidity, more in neck\par \par Bradykinesia in RAHM (right > left). Able to stand without use of arms. \par \par Gait with stooped posture, short steps, slow stride, decrease arm swing bilaterally, right more than left. Turns with multiple steps. No freezing of gait or shuffling. Pull test negative. \par \par

## 2023-07-14 ENCOUNTER — APPOINTMENT (OUTPATIENT)
Dept: NEUROLOGY | Facility: CLINIC | Age: 73
End: 2023-07-14
Payer: COMMERCIAL

## 2023-07-14 VITALS
BODY MASS INDEX: 29.51 KG/M2 | HEART RATE: 80 BPM | WEIGHT: 188 LBS | SYSTOLIC BLOOD PRESSURE: 131 MMHG | DIASTOLIC BLOOD PRESSURE: 81 MMHG | HEIGHT: 67 IN

## 2023-07-14 DIAGNOSIS — F32.A DEPRESSION, UNSPECIFIED: ICD-10-CM

## 2023-07-14 PROCEDURE — 99214 OFFICE O/P EST MOD 30 MIN: CPT

## 2023-07-14 NOTE — HISTORY OF PRESENT ILLNESS
[FreeTextEntry1] : Patient is a 72 year old right handed man with past medical history with depression, seeing psychiatrist, hypercholesterolemia, Parkinson's disease diagnosed by Dr. Goodson in 2015, but symptoms since about 2012 where people noticed his posture had changed. Started with right sided tremor upon rest, when he is nervous it gets worse since about several years ago. \par \par 1. Had injury in left hand from jar opening (1 month ago), tore tendon,  scaphoid and lunate. Has brace now\par 2. Since last visit, some leg spasm when walking and getting up from chair. Mobility "varies by the day". No falls, but feels balance is worse. No drooling or dysphagia. ALDs ok, a little slow. Tremor still only in right hand. Some back pain in left, takes motrin and tylenol. \par 3. On rasagiline and ropinirole ER to 12mg, printing is better. No ICDs. Had stopped Isradipine 5 mg, no difference for PD, but went back on for blood pressure.\par 4. Short term memory a little worse. Mood is good on duloxetine/lamotrigine/bupropion. No anxiety. Has restless feeling in legs at night, once in a while. Sleeps well. Rarely talks in sleep. No acting out of dreams, but does have vivid dreaming.. 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 \par 6. Had gained during covid, has not lost it again.  \par 7. BP: taking isradipine and also HCTZ

## 2023-07-14 NOTE — DISCUSSION/SUMMARY
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 He also noticed more changes in his handwriting, smaller and his voice was softer and lower as well. Some constipation, changes in his mood, depressed, stable now. Some changes in his memory, more short term. No family history. \par \par Overall, the clinical findings and history are indeed most consistent with idiopathic Parkinson's disease. Current symptomatology, asymmetric resting tremor, rigidity and bradykinesia, less consistent with atypical parkinsonism. He is having more difficulty with his daily activities for which treatment options were discussed, dopamine agonists, or levodopa. \par \par 1. 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 He does have more trouble with walking and sitting\par 3. Continue rasagiline and ropinirole. Counselled on side effects including ICDs. May need LD in future, but PT first.\par \par We discussed the above impression, plan and recommendations during the visit. Counseling represented more then 50% of the 30 minute visit time.

## 2023-07-14 NOTE — PHYSICAL EXAM
[Person] : oriented to person [Place] : oriented to place [Time] : oriented to time [Naming Objects] : no difficulty naming common objects [Repeating Phrases] : no difficulty repeating a phrase [Fluency] : fluency intact [Comprehension] : comprehension intact [Cranial Nerves Oculomotor (III)] : extraocular motion intact [Cranial Nerves Facial (VII)] : face symmetrical [Cranial Nerves Vestibulocochlear (VIII)] : hearing was intact bilaterally [Cranial Nerves Accessory (XI - Cranial And Spinal)] : head turning and shoulder shrug symmetric [Motor Handedness Right-Handed] : the patient is right hand dominant [Paresis Pronator Drift Right-Sided] : no pronator drift on the right [Paresis Pronator Drift Left-Sided] : no pronator drift on the left [Dysdiadochokinesia Bilaterally] : not present [Coordination - Dysmetria Impaired Finger-to-Nose Bilateral] : not present [1+] : Brachioradialis left 1+ [0] : Ankle jerk left 0 [FreeTextEntry1] : Facial expression is reduced and blinking rate is mildly decreased. \par \par Tone in voice is decreased. Extraocular movements were intact with normal saccades. Mild resting tremor in right upper extremity, and to a lesser extent on the left. \par \par Mild Postural tremor noted bilaterally, worse in right. \par \par Mild Right>left rigidity, more in neck. Bradykinesia in RAHM (right > left). Able to stand without use of arms. \par \par Gait with stooped posture, short steps, slow stride, decrease arm swing bilaterally, right more than left. Turns with multiple steps. No freezing of gait or shuffling. Pull test negative. \par \par

## 2023-12-06 RX ORDER — ROPINIROLE 12 MG/1
12 TABLET, FILM COATED, EXTENDED RELEASE ORAL
Qty: 90 | Refills: 3 | Status: ACTIVE | COMMUNITY
Start: 2020-06-24 | End: 1900-01-01

## 2024-02-06 ENCOUNTER — APPOINTMENT (OUTPATIENT)
Dept: NEUROLOGY | Facility: CLINIC | Age: 74
End: 2024-02-06
Payer: COMMERCIAL

## 2024-02-06 VITALS — DIASTOLIC BLOOD PRESSURE: 85 MMHG | HEART RATE: 88 BPM | HEIGHT: 67 IN | SYSTOLIC BLOOD PRESSURE: 134 MMHG

## 2024-02-06 DIAGNOSIS — G20.A1 PARKINSON'S DISEASE WITHOUT DYSKINESIA, WITHOUT MENTION OF FLUCTUATIONS: ICD-10-CM

## 2024-02-06 PROCEDURE — G2211 COMPLEX E/M VISIT ADD ON: CPT

## 2024-02-06 PROCEDURE — 99214 OFFICE O/P EST MOD 30 MIN: CPT

## 2024-02-06 NOTE — PHYSICAL EXAM
[Person] : oriented to person [Place] : oriented to place [Time] : oriented to time [Naming Objects] : no difficulty naming common objects [Repeating Phrases] : no difficulty repeating a phrase [Fluency] : fluency intact [Comprehension] : comprehension intact [Cranial Nerves Oculomotor (III)] : extraocular motion intact [Cranial Nerves Facial (VII)] : face symmetrical [Cranial Nerves Vestibulocochlear (VIII)] : hearing was intact bilaterally [Cranial Nerves Accessory (XI - Cranial And Spinal)] : head turning and shoulder shrug symmetric [Motor Handedness Right-Handed] : the patient is right hand dominant [Paresis Pronator Drift Right-Sided] : no pronator drift on the right [Paresis Pronator Drift Left-Sided] : no pronator drift on the left [Dysdiadochokinesia Bilaterally] : not present [Coordination - Dysmetria Impaired Finger-to-Nose Bilateral] : not present [1+] : Brachioradialis left 1+ [0] : Ankle jerk left 0 [FreeTextEntry1] : Facial expression is reduced and blinking rate is mildly decreased.   Tone in voice is decreased. Extraocular movements were intact with normal saccades. Mild resting tremor in right upper extremity, and to a lesser extent on the left.   Mild Right>left rigidity, more in neck. Bradykinesia in RAHM (right > left). Able to stand without use of arms.  No dystonia  Gait with stooped posture, short steps, slow stride, decrease arm swing bilaterally, right more than left. Turns with multiple steps. No freezing of gait or shuffling. Pull test negative.   Mild Postural tremor noted bilaterally, worse in right.

## 2024-02-06 NOTE — DISCUSSION/SUMMARY
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 He also noticed more changes in his handwriting, smaller and his voice was softer and lower as well. Some constipation, changes in his mood, depressed, stable now. Some changes in his memory, more short term. No family history.   Overall, the clinical findings and history are indeed most consistent with idiopathic Parkinson's disease. Current symptomatology, asymmetric resting tremor, rigidity and bradykinesia, less consistent with atypical parkinsonism. He is having more difficulty with his daily activities for which treatment options were discussed, dopamine agonists, or levodopa.   1. 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 He does have more trouble with walking and sitting 3. Continue rasagiline and ropinirole. Counselled on side effects including ICDs. May need LD in future. 4. No significant non-motor issues except treated depression.   We discussed the above impression, plan and recommendations during the visit. Counseling represented more then 50% of the 30 minute visit time.

## 2024-02-06 NOTE — HISTORY OF PRESENT ILLNESS
[FreeTextEntry1] : Patient is a 73 year old right handed man with past medical history with depression, seeing psychiatrist, hypercholesterolemia, Parkinson's disease diagnosed by Dr. Goodson in 2015, but symptoms since about 2012 where people noticed his posture had changed. Started with right sided tremor upon rest, when he is nervous it gets worse since about several years ago.   1. Had injury in left hand from jar opening (1 month ago), tore tendon,  scaphoid and lunate. Has brace now 2. No clear progression. Some leg spasm when walking and getting up from chair. Mobility "varies by the day". No falls, but feels balance is worse. No drooling or dysphagia. ALDs ok, a little slow. Tremor still only in right hand. Some back pain in left, takes motrin and tylenol.  3. On rasagiline and ropinirole ER to 12mg, printing is better. No ICDs or sleep attacks. Had stopped Isradipine 5 mg, no difference for PD, but went back on for blood pressure. 4. Short term memory a little worse. Mood is good on duloxetine/lamotrigine/bupropion. No anxiety. Has restless feeling in legs at night, once in a while. Sleeps well. Rarely talks in sleep. No acting out of dreams, but does have vivid dreaming..  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  6. Had gained during covid, has not lost it again.   7. BP: taking isradipine and also HCTZ 8. Some pain when turning head to right (at top of SCM)

## 2024-06-12 ENCOUNTER — NON-APPOINTMENT (OUTPATIENT)
Age: 74
End: 2024-06-12

## 2024-07-30 ENCOUNTER — APPOINTMENT (OUTPATIENT)
Dept: ORTHOPEDIC SURGERY | Facility: CLINIC | Age: 74
End: 2024-07-30

## 2024-07-30 PROCEDURE — 99204 OFFICE O/P NEW MOD 45 MIN: CPT | Mod: 25

## 2024-07-30 PROCEDURE — 73110 X-RAY EXAM OF WRIST: CPT | Mod: LT

## 2024-07-30 PROCEDURE — 20605 DRAIN/INJ JOINT/BURSA W/O US: CPT | Mod: LT

## 2024-08-08 PROBLEM — M19.132 SCAPHOLUNATE ADVANCED COLLAPSE OF LEFT WRIST: Status: ACTIVE | Noted: 2024-07-30

## 2024-08-30 ENCOUNTER — APPOINTMENT (OUTPATIENT)
Dept: NEUROLOGY | Facility: CLINIC | Age: 74
End: 2024-08-30

## 2024-09-09 ENCOUNTER — NON-APPOINTMENT (OUTPATIENT)
Age: 74
End: 2024-09-09

## 2024-09-26 ENCOUNTER — OUTPATIENT (OUTPATIENT)
Dept: OUTPATIENT SERVICES | Facility: HOSPITAL | Age: 74
LOS: 1 days | End: 2024-09-26
Payer: COMMERCIAL

## 2024-09-26 VITALS
OXYGEN SATURATION: 97 % | TEMPERATURE: 98 F | HEART RATE: 88 BPM | RESPIRATION RATE: 14 BRPM | SYSTOLIC BLOOD PRESSURE: 136 MMHG | DIASTOLIC BLOOD PRESSURE: 83 MMHG | WEIGHT: 195.99 LBS | HEIGHT: 66 IN

## 2024-09-26 DIAGNOSIS — Z01.818 ENCOUNTER FOR OTHER PREPROCEDURAL EXAMINATION: ICD-10-CM

## 2024-09-26 DIAGNOSIS — S69.92XA UNSPECIFIED INJURY OF LEFT WRIST, HAND AND FINGER(S), INITIAL ENCOUNTER: ICD-10-CM

## 2024-09-26 DIAGNOSIS — M19.132 POST-TRAUMATIC OSTEOARTHRITIS, LEFT WRIST: ICD-10-CM

## 2024-09-26 LAB
ANION GAP SERPL CALC-SCNC: 14 MMOL/L — SIGNIFICANT CHANGE UP (ref 5–17)
BUN SERPL-MCNC: 24 MG/DL — HIGH (ref 7–23)
CALCIUM SERPL-MCNC: 9.6 MG/DL — SIGNIFICANT CHANGE UP (ref 8.4–10.5)
CHLORIDE SERPL-SCNC: 104 MMOL/L — SIGNIFICANT CHANGE UP (ref 96–108)
CO2 SERPL-SCNC: 23 MMOL/L — SIGNIFICANT CHANGE UP (ref 22–31)
CREAT SERPL-MCNC: 0.87 MG/DL — SIGNIFICANT CHANGE UP (ref 0.5–1.3)
EGFR: 92 ML/MIN/1.73M2 — SIGNIFICANT CHANGE UP
GLUCOSE SERPL-MCNC: 90 MG/DL — SIGNIFICANT CHANGE UP (ref 70–99)
HCT VFR BLD CALC: 39.5 % — SIGNIFICANT CHANGE UP (ref 39–50)
HGB BLD-MCNC: 12.4 G/DL — LOW (ref 13–17)
MCHC RBC-ENTMCNC: 31.4 GM/DL — LOW (ref 32–36)
MCHC RBC-ENTMCNC: 31.4 PG — SIGNIFICANT CHANGE UP (ref 27–34)
MCV RBC AUTO: 100 FL — SIGNIFICANT CHANGE UP (ref 80–100)
NRBC # BLD: 0 /100 WBCS — SIGNIFICANT CHANGE UP (ref 0–0)
PLATELET # BLD AUTO: 297 K/UL — SIGNIFICANT CHANGE UP (ref 150–400)
POTASSIUM SERPL-MCNC: 4.2 MMOL/L — SIGNIFICANT CHANGE UP (ref 3.5–5.3)
POTASSIUM SERPL-SCNC: 4.2 MMOL/L — SIGNIFICANT CHANGE UP (ref 3.5–5.3)
RBC # BLD: 3.95 M/UL — LOW (ref 4.2–5.8)
RBC # FLD: 13 % — SIGNIFICANT CHANGE UP (ref 10.3–14.5)
SODIUM SERPL-SCNC: 141 MMOL/L — SIGNIFICANT CHANGE UP (ref 135–145)
WBC # BLD: 6.26 K/UL — SIGNIFICANT CHANGE UP (ref 3.8–10.5)
WBC # FLD AUTO: 6.26 K/UL — SIGNIFICANT CHANGE UP (ref 3.8–10.5)

## 2024-09-26 PROCEDURE — 85027 COMPLETE CBC AUTOMATED: CPT

## 2024-09-26 PROCEDURE — 80048 BASIC METABOLIC PNL TOTAL CA: CPT

## 2024-09-26 PROCEDURE — G0463: CPT

## 2024-09-26 NOTE — H&P PST ADULT - ASSESSMENT
Activity: Patient staets he can walk 2-3 blocks and climb a flight of stairs and denies symptoms.     DASI: 5.81    Mallampati: 3    Dental: Denies loose teeth or dentures.

## 2024-09-26 NOTE — H&P PST ADULT - NSICDXPASTMEDICALHX_GEN_ALL_CORE_FT
PAST MEDICAL HISTORY:  History of Parkinson's disease     HLD (hyperlipidemia)     Major depression

## 2024-09-26 NOTE — H&P PST ADULT - PROBLEM SELECTOR PLAN 1
Left Proximal Row Carpectomy, Posterior Interneurosseous Nerve and Anterior Interneurosseous Neurectomies on 10/7/24.   CBC and BMP done today at [facility].  Pre op instructions, including chlorhexidine, provided and all questions answered.

## 2024-09-26 NOTE — H&P PST ADULT - HISTORY OF PRESENT ILLNESS
74 year old male with PMH of Parkinson's, Depression, HLD. Patient states that almost a year ago he was trying to open a jar when he heard and felt a loud pop. He was seen at [facility] and had XR showing widening of the SL interval on  view. He wears a brace regularly which helps. He had one injection into the radiocarpal joint without relief. He presents today to PST prior to scheduled Left Proximal Row Carpectomy, Posterior Interneurosseous Nerve and Anterior Interneurosseous Neurectomies on 10/7/24.   Patient denies recent fever, chills, chest pain, SOB, palpitations, or recent exposure to COVID-19. 74 year old male with PMH of Parkinson's, Depression, HLD. Patient states that almost a year ago he was trying to open a jar when he heard and felt a loud pop. He was seen at [facility] and had XR showing widening of the SL interval on  view. He wears a brace regularly which helps. He had one injection into the radiocarpal joint without relief. He presents today to PST prior to scheduled Left Proximal Row Carpectomy, Posterior Interneurosseous Nerve and Anterior Interneurosseous Neurectomies on 10/7/24. Patient denies recent fever, chills, chest pain, SOB, palpitations, or recent exposure to COVID-19.

## 2024-09-26 NOTE — H&P PST ADULT - NSANTHOSAYNRD_GEN_A_CORE
No. TRA screening performed.  STOP BANG Legend: 0-2 = LOW Risk; 3-4 = INTERMEDIATE Risk; 5-8 = HIGH Risk

## 2024-09-30 ENCOUNTER — LABORATORY RESULT (OUTPATIENT)
Age: 74
End: 2024-09-30

## 2024-09-30 ENCOUNTER — NON-APPOINTMENT (OUTPATIENT)
Age: 74
End: 2024-09-30

## 2024-09-30 ENCOUNTER — APPOINTMENT (OUTPATIENT)
Dept: INTERNAL MEDICINE | Facility: CLINIC | Age: 74
End: 2024-09-30
Payer: COMMERCIAL

## 2024-09-30 VITALS — HEIGHT: 61.5 IN | BODY MASS INDEX: 35.79 KG/M2 | WEIGHT: 192 LBS

## 2024-09-30 DIAGNOSIS — Z00.00 ENCOUNTER FOR GENERAL ADULT MEDICAL EXAMINATION W/OUT ABNORMAL FINDINGS: ICD-10-CM

## 2024-09-30 DIAGNOSIS — E66.3 OVERWEIGHT: ICD-10-CM

## 2024-09-30 DIAGNOSIS — M19.132 POST-TRAUMATIC OSTEOARTHRITIS, LEFT WRIST: ICD-10-CM

## 2024-09-30 PROCEDURE — 36415 COLL VENOUS BLD VENIPUNCTURE: CPT

## 2024-09-30 PROCEDURE — 93000 ELECTROCARDIOGRAM COMPLETE: CPT

## 2024-09-30 PROCEDURE — 99387 INIT PM E/M NEW PAT 65+ YRS: CPT

## 2024-09-30 RX ORDER — TIRZEPATIDE 2.5 MG/.5ML
2.5 INJECTION, SOLUTION SUBCUTANEOUS
Qty: 1 | Refills: 1 | Status: ACTIVE | COMMUNITY
Start: 2024-09-30 | End: 1900-01-01

## 2024-09-30 RX ORDER — DULOXETINE HYDROCHLORIDE 30 MG/1
30 CAPSULE, DELAYED RELEASE PELLETS ORAL
Qty: 90 | Refills: 3 | Status: ACTIVE | COMMUNITY

## 2024-10-01 LAB
25(OH)D3 SERPL-MCNC: 27.9 NG/ML
ALBUMIN SERPL ELPH-MCNC: 4.6 G/DL
ALP BLD-CCNC: 67 U/L
ALT SERPL-CCNC: 17 U/L
ANION GAP SERPL CALC-SCNC: 14 MMOL/L
APPEARANCE: CLEAR
AST SERPL-CCNC: 20 U/L
BASOPHILS # BLD AUTO: 0.02 K/UL
BASOPHILS NFR BLD AUTO: 0.3 %
BILIRUB SERPL-MCNC: 0.4 MG/DL
BILIRUBIN URINE: NEGATIVE
BLOOD URINE: NEGATIVE
BUN SERPL-MCNC: 21 MG/DL
CALCIUM SERPL-MCNC: 9.9 MG/DL
CHLORIDE SERPL-SCNC: 103 MMOL/L
CHOLEST SERPL-MCNC: 170 MG/DL
CO2 SERPL-SCNC: 25 MMOL/L
COLOR: NORMAL
CREAT SERPL-MCNC: 1.12 MG/DL
EGFR: 70 ML/MIN/1.73M2
EOSINOPHIL # BLD AUTO: 0.05 K/UL
EOSINOPHIL NFR BLD AUTO: 0.7 %
ESTIMATED AVERAGE GLUCOSE: 114 MG/DL
GLUCOSE QUALITATIVE U: NEGATIVE MG/DL
GLUCOSE SERPL-MCNC: 100 MG/DL
HBA1C MFR BLD HPLC: 5.6 %
HCT VFR BLD CALC: 39.5 %
HDLC SERPL-MCNC: 85 MG/DL
HGB BLD-MCNC: 13.1 G/DL
IMM GRANULOCYTES NFR BLD AUTO: 0.3 %
KETONES URINE: ABNORMAL MG/DL
LDLC SERPL CALC-MCNC: 72 MG/DL
LEUKOCYTE ESTERASE URINE: ABNORMAL
LYMPHOCYTES # BLD AUTO: 1.74 K/UL
LYMPHOCYTES NFR BLD AUTO: 25.4 %
MAN DIFF?: NORMAL
MCHC RBC-ENTMCNC: 32.9 PG
MCHC RBC-ENTMCNC: 33.2 GM/DL
MCV RBC AUTO: 99.2 FL
MONOCYTES # BLD AUTO: 0.58 K/UL
MONOCYTES NFR BLD AUTO: 8.5 %
NEUTROPHILS # BLD AUTO: 4.43 K/UL
NEUTROPHILS NFR BLD AUTO: 64.8 %
NITRITE URINE: NEGATIVE
NONHDLC SERPL-MCNC: 86 MG/DL
PH URINE: 5.5
PLATELET # BLD AUTO: 289 K/UL
POTASSIUM SERPL-SCNC: 4.3 MMOL/L
PROT SERPL-MCNC: 6.9 G/DL
PROTEIN URINE: 30 MG/DL
PSA SERPL-MCNC: 3.87 NG/ML
RBC # BLD: 3.98 M/UL
RBC # FLD: 13.1 %
SODIUM SERPL-SCNC: 143 MMOL/L
SPECIFIC GRAVITY URINE: >1.03
T3RU NFR SERPL: 0.9 TBI
T4 SERPL-MCNC: 5.6 UG/DL
TRIGL SERPL-MCNC: 70 MG/DL
TSH SERPL-ACNC: 1.95 UIU/ML
URATE SERPL-MCNC: 6.5 MG/DL
UROBILINOGEN URINE: 1 MG/DL
WBC # FLD AUTO: 6.84 K/UL

## 2024-10-02 ENCOUNTER — TRANSCRIPTION ENCOUNTER (OUTPATIENT)
Age: 74
End: 2024-10-02

## 2024-10-03 NOTE — PHYSICAL EXAM
[No Acute Distress] : no acute distress [Well Nourished] : well nourished [Well Developed] : well developed [Well-Appearing] : well-appearing [Normal Sclera/Conjunctiva] : normal sclera/conjunctiva [PERRL] : pupils equal round and reactive to light [EOMI] : extraocular movements intact [Normal Outer Ear/Nose] : the outer ears and nose were normal in appearance [Normal Oropharynx] : the oropharynx was normal [No JVD] : no jugular venous distention [No Lymphadenopathy] : no lymphadenopathy [Supple] : supple [Thyroid Normal, No Nodules] : the thyroid was normal and there were no nodules present [No Respiratory Distress] : no respiratory distress  [No Accessory Muscle Use] : no accessory muscle use [Clear to Auscultation] : lungs were clear to auscultation bilaterally [Normal Rate] : normal rate  [Regular Rhythm] : with a regular rhythm [Normal S1, S2] : normal S1 and S2 [No Murmur] : no murmur heard [No Carotid Bruits] : no carotid bruits [No Abdominal Bruit] : a ~M bruit was not heard ~T in the abdomen [No Varicosities] : no varicosities [Pedal Pulses Present] : the pedal pulses are present [No Edema] : there was no peripheral edema [No Palpable Aorta] : no palpable aorta [No Extremity Clubbing/Cyanosis] : no extremity clubbing/cyanosis [Soft] : abdomen soft [Non Tender] : non-tender [Non-distended] : non-distended [No Masses] : no abdominal mass palpated [No HSM] : no HSM [Normal Bowel Sounds] : normal bowel sounds [Normal Posterior Cervical Nodes] : no posterior cervical lymphadenopathy [Normal Anterior Cervical Nodes] : no anterior cervical lymphadenopathy [No CVA Tenderness] : no CVA  tenderness [No Spinal Tenderness] : no spinal tenderness [No Joint Swelling] : no joint swelling [Grossly Normal Strength/Tone] : grossly normal strength/tone [No Rash] : no rash [No Focal Deficits] : no focal deficits [Deep Tendon Reflexes (DTR)] : deep tendon reflexes were 2+ and symmetric [Normal Affect] : the affect was normal [Normal Insight/Judgement] : insight and judgment were intact [de-identified] : Bradykinesia

## 2024-10-03 NOTE — HEALTH RISK ASSESSMENT
[Good] : ~his/her~  mood as  good [Yes] : Yes [No] : In the past 12 months have you used drugs other than those required for medical reasons? 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 [] :  [Feels Safe at Home] : Feels safe at home [Fully functional (bathing, dressing, toileting, transferring, walking, feeding)] : Fully functional (bathing, dressing, toileting, transferring, walking, feeding) [Fully functional (using the telephone, shopping, preparing meals, housekeeping, doing laundry, using] : Fully functional and needs no help or supervision to perform IADLs (using the telephone, shopping, preparing meals, housekeeping, doing laundry, using transportation, managing medications and managing finances) [Smoke Detector] : smoke detector [I will adhere to the patient's wishes.] : I will adhere to the patient's wishes. [[id number]] : 0 [Change in mental status noted] : No change in mental status noted [Reports changes in hearing] : Reports no changes in hearing [Reports changes in vision] : Reports no changes in vision [Reports changes in dental health] : Reports no changes in dental health [Safety elements used in home] : no safety elements used in home [Sunscreen] : does not use sunscreen [TB Exposure] : is not being exposed to tuberculosis [Caregiver Concerns] : does not have caregiver concerns

## 2024-10-03 NOTE — HISTORY OF PRESENT ILLNESS
[FreeTextEntry1] : This is a 74-year-old male for reestablishment of medical care for annual health assessment and for medical clearance [de-identified] : .  Patient has developed Parkinson's disease this is approximately 15 years in duration diagnosed in 2015 symptoms began 2012.  He currently states that he is doing well since mobile movement and difficulty with walking more than 3 blocks.  He also states that he has a history of depression that has been ameliorated on medication.  He also has a scaphoid and lunate separation for which he will be receiving surgery Other than the above his review of systems is negative except for nocturia x 2 in addition of course he does have difficulty with range of motion and pain in his left wrist

## 2024-10-03 NOTE — HEALTH RISK ASSESSMENT
[Good] : ~his/her~  mood as  good [Yes] : Yes [No] : In the past 12 months have you used drugs other than those required for medical reasons? 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 [] :  [Feels Safe at Home] : Feels safe at home [Fully functional (bathing, dressing, toileting, transferring, walking, feeding)] : Fully functional (bathing, dressing, toileting, transferring, walking, feeding) [Fully functional (using the telephone, shopping, preparing meals, housekeeping, doing laundry, using] : Fully functional and needs no help or supervision to perform IADLs (using the telephone, shopping, preparing meals, housekeeping, doing laundry, using transportation, managing medications and managing finances) [Smoke Detector] : smoke detector [I will adhere to the patient's wishes.] : I will adhere to the patient's wishes. [[id number]] : 0 [Change in mental status noted] : No change in mental status noted [Reports changes in hearing] : Reports no changes in hearing [Reports changes in vision] : Reports no changes in vision [Reports changes in dental health] : Reports no changes in dental health [Safety elements used in home] : no safety elements used in home [Sunscreen] : does not use sunscreen [TB Exposure] : is not being exposed to tuberculosis [Caregiver Concerns] : does not have caregiver concerns

## 2024-10-03 NOTE — HISTORY OF PRESENT ILLNESS
[FreeTextEntry1] : This is a 74-year-old male for reestablishment of medical care for annual health assessment and for medical clearance [de-identified] : .  Patient has developed Parkinson's disease this is approximately 15 years in duration diagnosed in 2015 symptoms began 2012.  He currently states that he is doing well since mobile movement and difficulty with walking more than 3 blocks.  He also states that he has a history of depression that has been ameliorated on medication.  He also has a scaphoid and lunate separation for which he will be receiving surgery Other than the above his review of systems is negative except for nocturia x 2 in addition of course he does have difficulty with range of motion and pain in his left wrist

## 2024-10-03 NOTE — PHYSICAL EXAM
[No Acute Distress] : no acute distress [Well Nourished] : well nourished [Well Developed] : well developed [Well-Appearing] : well-appearing [Normal Sclera/Conjunctiva] : normal sclera/conjunctiva [PERRL] : pupils equal round and reactive to light [EOMI] : extraocular movements intact [Normal Outer Ear/Nose] : the outer ears and nose were normal in appearance [Normal Oropharynx] : the oropharynx was normal [No JVD] : no jugular venous distention [No Lymphadenopathy] : no lymphadenopathy [Supple] : supple [Thyroid Normal, No Nodules] : the thyroid was normal and there were no nodules present [No Respiratory Distress] : no respiratory distress  [No Accessory Muscle Use] : no accessory muscle use [Clear to Auscultation] : lungs were clear to auscultation bilaterally [Normal Rate] : normal rate  [Regular Rhythm] : with a regular rhythm [Normal S1, S2] : normal S1 and S2 [No Murmur] : no murmur heard [No Carotid Bruits] : no carotid bruits [No Abdominal Bruit] : a ~M bruit was not heard ~T in the abdomen [No Varicosities] : no varicosities [Pedal Pulses Present] : the pedal pulses are present [No Edema] : there was no peripheral edema [No Palpable Aorta] : no palpable aorta [No Extremity Clubbing/Cyanosis] : no extremity clubbing/cyanosis [Soft] : abdomen soft [Non Tender] : non-tender [Non-distended] : non-distended [No Masses] : no abdominal mass palpated [No HSM] : no HSM [Normal Bowel Sounds] : normal bowel sounds [Normal Posterior Cervical Nodes] : no posterior cervical lymphadenopathy [Normal Anterior Cervical Nodes] : no anterior cervical lymphadenopathy [No CVA Tenderness] : no CVA  tenderness [No Spinal Tenderness] : no spinal tenderness [No Joint Swelling] : no joint swelling [Grossly Normal Strength/Tone] : grossly normal strength/tone [No Rash] : no rash [No Focal Deficits] : no focal deficits [Deep Tendon Reflexes (DTR)] : deep tendon reflexes were 2+ and symmetric [Normal Affect] : the affect was normal [Normal Insight/Judgement] : insight and judgment were intact [de-identified] : Bradykinesia

## 2024-10-03 NOTE — ASSESSMENT
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  We did have a discussion We will wait for his cholesterol profile  Patient's physical examination is normal with the exception of Parkinson's and weight gain.  We will give him a Prevnar.  He is due for shingles COVID he is also due for a colonoscopy.  His physical examination was significant for mild orthostasis.  I did caution him about rising quickly.  If he should develop symptoms we can consider intervention  Pending laboratory which will be reviewed no medical contraindications to procedure  Laboratory reviewed no medical contraindications to procedure

## 2024-10-07 ENCOUNTER — APPOINTMENT (OUTPATIENT)
Dept: ORTHOPEDIC SURGERY | Facility: HOSPITAL | Age: 74
End: 2024-10-07

## 2024-10-11 ENCOUNTER — TRANSCRIPTION ENCOUNTER (OUTPATIENT)
Age: 74
End: 2024-10-11

## 2024-10-11 ENCOUNTER — APPOINTMENT (OUTPATIENT)
Dept: ORTHOPEDIC SURGERY | Facility: HOSPITAL | Age: 74
End: 2024-10-11

## 2024-10-11 ENCOUNTER — OUTPATIENT (OUTPATIENT)
Dept: OUTPATIENT SERVICES | Facility: HOSPITAL | Age: 74
LOS: 1 days | End: 2024-10-11
Payer: COMMERCIAL

## 2024-10-11 VITALS
DIASTOLIC BLOOD PRESSURE: 59 MMHG | SYSTOLIC BLOOD PRESSURE: 107 MMHG | TEMPERATURE: 97 F | RESPIRATION RATE: 16 BRPM | HEART RATE: 86 BPM | OXYGEN SATURATION: 98 % | HEIGHT: 66 IN | WEIGHT: 195.99 LBS

## 2024-10-11 VITALS
RESPIRATION RATE: 15 BRPM | DIASTOLIC BLOOD PRESSURE: 70 MMHG | SYSTOLIC BLOOD PRESSURE: 144 MMHG | HEART RATE: 91 BPM | TEMPERATURE: 97 F | OXYGEN SATURATION: 96 %

## 2024-10-11 DIAGNOSIS — M19.132 POST-TRAUMATIC OSTEOARTHRITIS, LEFT WRIST: ICD-10-CM

## 2024-10-11 PROCEDURE — 25215 REMOVAL OF WRIST BONES: CPT | Mod: LT

## 2024-10-11 PROCEDURE — 64772 INCISION OF SPINAL NERVE: CPT | Mod: LT

## 2024-10-11 PROCEDURE — 76000 FLUOROSCOPY <1 HR PHYS/QHP: CPT

## 2024-10-11 RX ORDER — RASAGILINE 0.5 MG/1
1 TABLET ORAL
Refills: 0 | DISCHARGE

## 2024-10-11 RX ORDER — LIDOCAINE HCL 20 MG/ML
0.2 AMPUL (ML) INJECTION ONCE
Refills: 0 | Status: COMPLETED | OUTPATIENT
Start: 2024-10-11 | End: 2024-10-11

## 2024-10-11 RX ORDER — DULOXETINE HCL 20 MG
1 CAPSULE,DELAYED RELEASE (ENTERIC COATED) ORAL
Refills: 0 | DISCHARGE

## 2024-10-11 RX ORDER — OXYCODONE 5 MG/1
5 TABLET ORAL
Qty: 10 | Refills: 0 | Status: ACTIVE | COMMUNITY
Start: 2024-10-11 | End: 1900-01-01

## 2024-10-11 RX ORDER — ASPIRIN 325 MG
1 TABLET ORAL
Refills: 0 | DISCHARGE

## 2024-10-11 RX ORDER — FENTANYL CITRATE-0.9 % NACL/PF 300MCG/30
25 PATIENT CONTROLLED ANALGESIA VIAL INJECTION
Refills: 0 | Status: DISCONTINUED | OUTPATIENT
Start: 2024-10-11 | End: 2024-10-11

## 2024-10-11 RX ORDER — CHLORHEXIDINE GLUCONATE ORAL RINSE 1.2 MG/ML
1 SOLUTION DENTAL ONCE
Refills: 0 | Status: COMPLETED | OUTPATIENT
Start: 2024-10-11 | End: 2024-10-11

## 2024-10-11 RX ORDER — PRAVASTATIN SODIUM 20 MG/1
1 TABLET ORAL
Refills: 0 | DISCHARGE

## 2024-10-11 RX ORDER — ISRADIPINE 5 MG/1
1 CAPSULE ORAL
Refills: 0 | DISCHARGE

## 2024-10-11 RX ORDER — LAMOTRIGINE 25 MG/1
1 TABLET ORAL
Refills: 0 | DISCHARGE

## 2024-10-11 RX ORDER — SODIUM CHLORIDE IRRIG SOLUTION 0.9 %
1000 SOLUTION, IRRIGATION IRRIGATION
Refills: 0 | Status: ACTIVE | OUTPATIENT
Start: 2024-10-11 | End: 2025-09-09

## 2024-10-11 RX ORDER — ONDANSETRON HCL/PF 4 MG/2 ML
4 VIAL (ML) INJECTION ONCE
Refills: 0 | Status: ACTIVE | OUTPATIENT
Start: 2024-10-11 | End: 2025-09-09

## 2024-10-11 RX ORDER — SODIUM CHLORIDE 0.9 % (FLUSH) 0.9 %
3 SYRINGE (ML) INJECTION EVERY 8 HOURS
Refills: 0 | Status: ACTIVE | OUTPATIENT
Start: 2024-10-11 | End: 2025-09-09

## 2024-10-11 RX ORDER — ROPINIROLE 2 MG/1
1 TABLET, EXTENDED RELEASE ORAL
Refills: 0 | DISCHARGE

## 2024-10-11 RX ADMIN — Medication 3 MILLILITER(S): at 07:54

## 2024-10-11 RX ADMIN — CHLORHEXIDINE GLUCONATE ORAL RINSE 1 APPLICATION(S): 1.2 SOLUTION DENTAL at 07:55

## 2024-10-11 RX ADMIN — Medication 100 MILLILITER(S): at 07:55

## 2024-10-11 NOTE — ASU DISCHARGE PLAN (ADULT/PEDIATRIC) - NURSING INSTRUCTIONS
OK to take Tylenol/Acetaminophen as soon as needed for pain and every 6 hours after as needed. OK to take Motrin/Ibuprofen as soon as needed for pain and every 6 hours after as needed.

## 2024-10-11 NOTE — ASU PATIENT PROFILE, ADULT - FALL HARM RISK - UNIVERSAL INTERVENTIONS
Bed in lowest position, wheels locked, appropriate side rails in place/Call bell, personal items and telephone in reach/Instruct patient to call for assistance before getting out of bed or chair/Non-slip footwear when patient is out of bed/[place] to call system/Physically safe environment - no spills, clutter or unnecessary equipment/Purposeful Proactive Rounding/Room/bathroom lighting operational, light cord in reach

## 2024-10-11 NOTE — ASU DISCHARGE PLAN (ADULT/PEDIATRIC) - NS MD DC FALL RISK RISK
For information on Fall & Injury Prevention, visit: [email or web address] OR  [email or web address] OR  https://www.cdc.gov/steadi/patient.html

## 2024-10-11 NOTE — ASU DISCHARGE PLAN (ADULT/PEDIATRIC) - CARE PROVIDER_API CALL
Freya Pineda  [facility]  [address], [address]  [place] [address]  Phone: [phone]  Fax: [phone]  Follow Up Time:

## 2024-10-18 ENCOUNTER — NON-APPOINTMENT (OUTPATIENT)
Age: 74
End: 2024-10-18

## 2024-10-25 ENCOUNTER — APPOINTMENT (OUTPATIENT)
Dept: ORTHOPEDIC SURGERY | Facility: CLINIC | Age: 74
End: 2024-10-25
Payer: COMMERCIAL

## 2024-10-25 DIAGNOSIS — M19.132 POST-TRAUMATIC OSTEOARTHRITIS, LEFT WRIST: ICD-10-CM

## 2024-10-25 PROCEDURE — 99024 POSTOP FOLLOW-UP VISIT: CPT

## 2024-10-25 PROCEDURE — 73110 X-RAY EXAM OF WRIST: CPT | Mod: LT

## 2024-11-07 ENCOUNTER — APPOINTMENT (OUTPATIENT)
Dept: INTERNAL MEDICINE | Facility: CLINIC | Age: 74
End: 2024-11-07

## 2024-11-07 ENCOUNTER — NON-APPOINTMENT (OUTPATIENT)
Age: 74
End: 2024-11-07

## 2024-11-07 ENCOUNTER — OUTPATIENT (OUTPATIENT)
Dept: OUTPATIENT SERVICES | Facility: HOSPITAL | Age: 74
LOS: 1 days | End: 2024-11-07

## 2024-11-07 ENCOUNTER — TRANSCRIPTION ENCOUNTER (OUTPATIENT)
Age: 74
End: 2024-11-07

## 2024-11-08 PROBLEM — E78.5 HYPERLIPIDEMIA, UNSPECIFIED: Chronic | Status: ACTIVE | Noted: 2024-09-26

## 2024-11-08 PROBLEM — Z86.69 PERSONAL HISTORY OF OTHER DISEASES OF THE NERVOUS SYSTEM AND SENSE ORGANS: Chronic | Status: ACTIVE | Noted: 2024-09-26

## 2024-11-08 PROBLEM — F32.9 MAJOR DEPRESSIVE DISORDER, SINGLE EPISODE, UNSPECIFIED: Chronic | Status: ACTIVE | Noted: 2024-09-26

## 2024-11-11 ENCOUNTER — TRANSCRIPTION ENCOUNTER (OUTPATIENT)
Age: 74
End: 2024-11-11

## 2024-11-13 ENCOUNTER — TRANSCRIPTION ENCOUNTER (OUTPATIENT)
Age: 74
End: 2024-11-13

## 2024-11-16 ENCOUNTER — NON-APPOINTMENT (OUTPATIENT)
Age: 74
End: 2024-11-16

## 2024-12-06 ENCOUNTER — APPOINTMENT (OUTPATIENT)
Dept: ORTHOPEDIC SURGERY | Facility: CLINIC | Age: 74
End: 2024-12-06
Payer: COMMERCIAL

## 2024-12-06 DIAGNOSIS — M19.132 POST-TRAUMATIC OSTEOARTHRITIS, LEFT WRIST: ICD-10-CM

## 2024-12-06 PROCEDURE — 99024 POSTOP FOLLOW-UP VISIT: CPT

## 2024-12-06 PROCEDURE — 73110 X-RAY EXAM OF WRIST: CPT | Mod: LT

## 2024-12-12 ENCOUNTER — TRANSCRIPTION ENCOUNTER (OUTPATIENT)
Age: 74
End: 2024-12-12

## 2025-01-07 ENCOUNTER — TRANSCRIPTION ENCOUNTER (OUTPATIENT)
Age: 75
End: 2025-01-07

## 2025-01-10 ENCOUNTER — NON-APPOINTMENT (OUTPATIENT)
Age: 75
End: 2025-01-10

## 2025-01-13 ENCOUNTER — TRANSCRIPTION ENCOUNTER (OUTPATIENT)
Age: 75
End: 2025-01-13

## 2025-01-14 ENCOUNTER — APPOINTMENT (OUTPATIENT)
Dept: ORTHOPEDIC SURGERY | Facility: CLINIC | Age: 75
End: 2025-01-14
Payer: COMMERCIAL

## 2025-01-14 DIAGNOSIS — M19.132 POST-TRAUMATIC OSTEOARTHRITIS, LEFT WRIST: ICD-10-CM

## 2025-01-14 PROCEDURE — 73110 X-RAY EXAM OF WRIST: CPT | Mod: LT

## 2025-01-14 PROCEDURE — 99213 OFFICE O/P EST LOW 20 MIN: CPT

## 2025-03-11 ENCOUNTER — NON-APPOINTMENT (OUTPATIENT)
Age: 75
End: 2025-03-11

## 2025-03-11 ENCOUNTER — APPOINTMENT (OUTPATIENT)
Dept: NEUROLOGY | Facility: CLINIC | Age: 75
End: 2025-03-11
Payer: COMMERCIAL

## 2025-03-11 VITALS
BODY MASS INDEX: 31.31 KG/M2 | HEIGHT: 61.5 IN | HEART RATE: 93 BPM | SYSTOLIC BLOOD PRESSURE: 94 MMHG | DIASTOLIC BLOOD PRESSURE: 55 MMHG | WEIGHT: 168 LBS

## 2025-03-11 DIAGNOSIS — G89.29 CERVICALGIA: ICD-10-CM

## 2025-03-11 DIAGNOSIS — M54.2 CERVICALGIA: ICD-10-CM

## 2025-03-11 PROCEDURE — G2211 COMPLEX E/M VISIT ADD ON: CPT

## 2025-03-11 PROCEDURE — 99214 OFFICE O/P EST MOD 30 MIN: CPT

## 2025-05-16 ENCOUNTER — NON-APPOINTMENT (OUTPATIENT)
Age: 75
End: 2025-05-16

## 2025-05-22 ENCOUNTER — NON-APPOINTMENT (OUTPATIENT)
Age: 75
End: 2025-05-22

## 2025-07-14 ENCOUNTER — APPOINTMENT (OUTPATIENT)
Dept: ORTHOPEDIC SURGERY | Facility: CLINIC | Age: 75
End: 2025-07-14

## 2025-09-10 ENCOUNTER — TRANSCRIPTION ENCOUNTER (OUTPATIENT)
Age: 75
End: 2025-09-10

## 2025-09-11 ENCOUNTER — NON-APPOINTMENT (OUTPATIENT)
Age: 75
End: 2025-09-11

## (undated) DEVICE — DRSG DERMABOND 0.7ML

## (undated) DEVICE — GLV 7 PROTEXIS (WHITE)

## (undated) DEVICE — PACK HAND

## (undated) DEVICE — SUT MONOCRYL 5-0 18" P-3 UNDYED

## (undated) DEVICE — SOL IRR POUR NS 0.9% 500ML

## (undated) DEVICE — DRSG STERISTRIPS 0.5 X 4"

## (undated) DEVICE — PREP CHLORAPREP HI-LITE ORANGE 26ML

## (undated) DEVICE — MEDICATION LABELS W MARKER

## (undated) DEVICE — DRAPE TOWEL BLUE 17" X 24"

## (undated) DEVICE — DRSG COBAN 2" LF STERILE

## (undated) DEVICE — TOURNIQUET CUFF 18" DUAL PORT SINGLE BLADDER W PLC  (BLACK)

## (undated) DEVICE — TOURNIQUET CUFF 12" DUAL PORT W PLC